# Patient Record
Sex: FEMALE | Race: WHITE | URBAN - METROPOLITAN AREA
[De-identification: names, ages, dates, MRNs, and addresses within clinical notes are randomized per-mention and may not be internally consistent; named-entity substitution may affect disease eponyms.]

---

## 2024-01-17 ENCOUNTER — SOCIAL WORK (OUTPATIENT)
Dept: BEHAVIORAL HEALTH | Facility: CLINIC | Age: 49
End: 2024-01-17

## 2024-01-17 DIAGNOSIS — F33.1 MODERATE EPISODE OF RECURRENT MAJOR DEPRESSIVE DISORDER (MULTI): ICD-10-CM

## 2024-01-17 NOTE — PROGRESS NOTES
Televideo Informed Consent for psychological evaluation was reviewed with the patient as follows:     There are potential benefits and risks of the use of telephone or video-conferencing that differ from in-person sessions. Specifically, the telephone or televideo system we are using may not be HIPPA compliant and may present limits to patient confidentiality. Confidentiality still applies for telepsychology services, and nobody will record the session without your permission.    Understanding and verbal agreement was attested to by the patient. Patient identity was confirmed using 3 sources, including telephone number, email address and date of birth. Provision of services via telehealth was necessitated by the restrictions on face-to-face visits accompanying the COVID-19 pandemic.     SECURE NOTE:  This document may not be released or reproduced in any form without the consent of either the Provider, the Provider´s  or the Chair of the Department of Psychiatry. This prohibition includes copying the document into any non-Restricted area of the Ambulatory Electronic Medical Record.      Start time : 8:02 am  End time :  8:56 am    Diagnoses : MDD, HX of alcohol addiction - sober 12 years    Clients current issues: Her sister is pregnant. Client doesn't support this pregnancy.  Client doesn't feel seen in her family.   Client wants to change some of her family dynamics.     Client has gotten help at work with new employees. This threatens her part that builds her identity around her career.    Tatiana is in therapy. She has anxiety.   Son has a learning disability.    Treatment interventions: Reality therapy, DBT,  IFS - Discussed ways client can have opposing feelings that can be true at the same time. Discussed the parts of client that are reacting to her pregnancy. Suggested client working on soothing her parts that are not getting what they want.   She recognizes that there is a part that is  judging herself. Discussed how to recognize this part and honor it.     Prognosis: good    Treatment plan update: Maintain current goals.     Continue weekly ongoing psychotherapy.